# Patient Record
Sex: FEMALE | Race: BLACK OR AFRICAN AMERICAN | NOT HISPANIC OR LATINO | URBAN - METROPOLITAN AREA
[De-identification: names, ages, dates, MRNs, and addresses within clinical notes are randomized per-mention and may not be internally consistent; named-entity substitution may affect disease eponyms.]

---

## 2024-05-29 ENCOUNTER — LAB (OUTPATIENT)
Dept: LAB | Facility: LAB | Age: 24
End: 2024-05-29
Payer: COMMERCIAL

## 2024-05-29 ENCOUNTER — OFFICE VISIT (OUTPATIENT)
Dept: PRIMARY CARE | Facility: CLINIC | Age: 24
End: 2024-05-29
Payer: COMMERCIAL

## 2024-05-29 VITALS
SYSTOLIC BLOOD PRESSURE: 104 MMHG | TEMPERATURE: 96.4 F | WEIGHT: 133.2 LBS | HEIGHT: 68 IN | BODY MASS INDEX: 20.19 KG/M2 | OXYGEN SATURATION: 99 % | HEART RATE: 80 BPM | DIASTOLIC BLOOD PRESSURE: 75 MMHG

## 2024-05-29 DIAGNOSIS — R55 SYNCOPE, UNSPECIFIED SYNCOPE TYPE: Primary | ICD-10-CM

## 2024-05-29 DIAGNOSIS — Z11.59 ENCOUNTER FOR HEPATITIS C SCREENING TEST FOR LOW RISK PATIENT: ICD-10-CM

## 2024-05-29 DIAGNOSIS — R55 SYNCOPE, UNSPECIFIED SYNCOPE TYPE: ICD-10-CM

## 2024-05-29 LAB
ALBUMIN SERPL BCP-MCNC: 4.6 G/DL (ref 3.4–5)
ALP SERPL-CCNC: 60 U/L (ref 33–110)
ALT SERPL W P-5'-P-CCNC: 7 U/L (ref 7–45)
ANION GAP SERPL CALC-SCNC: 13 MMOL/L (ref 10–20)
AST SERPL W P-5'-P-CCNC: 13 U/L (ref 9–39)
BASOPHILS # BLD AUTO: 0.05 X10*3/UL (ref 0–0.1)
BASOPHILS NFR BLD AUTO: 0.5 %
BILIRUB SERPL-MCNC: 0.5 MG/DL (ref 0–1.2)
BUN SERPL-MCNC: 11 MG/DL (ref 6–23)
CALCIUM SERPL-MCNC: 9.8 MG/DL (ref 8.6–10.6)
CHLORIDE SERPL-SCNC: 104 MMOL/L (ref 98–107)
CHOLEST SERPL-MCNC: 129 MG/DL (ref 0–199)
CHOLESTEROL/HDL RATIO: 2.6
CO2 SERPL-SCNC: 26 MMOL/L (ref 21–32)
CREAT SERPL-MCNC: 0.65 MG/DL (ref 0.5–1.05)
EGFRCR SERPLBLD CKD-EPI 2021: >90 ML/MIN/1.73M*2
EOSINOPHIL # BLD AUTO: 0.57 X10*3/UL (ref 0–0.7)
EOSINOPHIL NFR BLD AUTO: 5.8 %
ERYTHROCYTE [DISTWIDTH] IN BLOOD BY AUTOMATED COUNT: 13.7 % (ref 11.5–14.5)
GLUCOSE SERPL-MCNC: 80 MG/DL (ref 74–99)
HCT VFR BLD AUTO: 40.3 % (ref 36–46)
HCV AB SER QL: NONREACTIVE
HDLC SERPL-MCNC: 48.9 MG/DL
HGB BLD-MCNC: 12.2 G/DL (ref 12–16)
IMM GRANULOCYTES # BLD AUTO: 0.02 X10*3/UL (ref 0–0.7)
IMM GRANULOCYTES NFR BLD AUTO: 0.2 % (ref 0–0.9)
IRON SATN MFR SERPL: 11 % (ref 25–45)
IRON SERPL-MCNC: 49 UG/DL (ref 35–150)
LDLC SERPL CALC-MCNC: 71 MG/DL
LYMPHOCYTES # BLD AUTO: 2.71 X10*3/UL (ref 1.2–4.8)
LYMPHOCYTES NFR BLD AUTO: 27.8 %
MCH RBC QN AUTO: 25.7 PG (ref 26–34)
MCHC RBC AUTO-ENTMCNC: 30.3 G/DL (ref 32–36)
MCV RBC AUTO: 85 FL (ref 80–100)
MONOCYTES # BLD AUTO: 0.5 X10*3/UL (ref 0.1–1)
MONOCYTES NFR BLD AUTO: 5.1 %
NEUTROPHILS # BLD AUTO: 5.9 X10*3/UL (ref 1.2–7.7)
NEUTROPHILS NFR BLD AUTO: 60.6 %
NON HDL CHOLESTEROL: 80 MG/DL (ref 0–149)
NRBC BLD-RTO: 0 /100 WBCS (ref 0–0)
PLATELET # BLD AUTO: 379 X10*3/UL (ref 150–450)
POTASSIUM SERPL-SCNC: 4.5 MMOL/L (ref 3.5–5.3)
PROT SERPL-MCNC: 7.6 G/DL (ref 6.4–8.2)
RBC # BLD AUTO: 4.74 X10*6/UL (ref 4–5.2)
SODIUM SERPL-SCNC: 138 MMOL/L (ref 136–145)
TIBC SERPL-MCNC: 450 UG/DL (ref 240–445)
TRIGL SERPL-MCNC: 44 MG/DL (ref 0–149)
UIBC SERPL-MCNC: 401 UG/DL (ref 110–370)
VLDL: 9 MG/DL (ref 0–40)
WBC # BLD AUTO: 9.8 X10*3/UL (ref 4.4–11.3)

## 2024-05-29 PROCEDURE — 80053 COMPREHEN METABOLIC PANEL: CPT

## 2024-05-29 PROCEDURE — 83540 ASSAY OF IRON: CPT

## 2024-05-29 PROCEDURE — 99204 OFFICE O/P NEW MOD 45 MIN: CPT

## 2024-05-29 PROCEDURE — 83550 IRON BINDING TEST: CPT

## 2024-05-29 PROCEDURE — 36415 COLL VENOUS BLD VENIPUNCTURE: CPT

## 2024-05-29 PROCEDURE — 1036F TOBACCO NON-USER: CPT

## 2024-05-29 PROCEDURE — 85025 COMPLETE CBC W/AUTO DIFF WBC: CPT

## 2024-05-29 PROCEDURE — 80061 LIPID PANEL: CPT

## 2024-05-29 PROCEDURE — 86803 HEPATITIS C AB TEST: CPT

## 2024-05-29 ASSESSMENT — COLUMBIA-SUICIDE SEVERITY RATING SCALE - C-SSRS
6. HAVE YOU EVER DONE ANYTHING, STARTED TO DO ANYTHING, OR PREPARED TO DO ANYTHING TO END YOUR LIFE?: NO
1. IN THE PAST MONTH, HAVE YOU WISHED YOU WERE DEAD OR WISHED YOU COULD GO TO SLEEP AND NOT WAKE UP?: NO
2. HAVE YOU ACTUALLY HAD ANY THOUGHTS OF KILLING YOURSELF?: NO

## 2024-05-29 ASSESSMENT — PATIENT HEALTH QUESTIONNAIRE - PHQ9
SUM OF ALL RESPONSES TO PHQ9 QUESTIONS 1 AND 2: 0
2. FEELING DOWN, DEPRESSED OR HOPELESS: NOT AT ALL
1. LITTLE INTEREST OR PLEASURE IN DOING THINGS: NOT AT ALL

## 2024-05-29 ASSESSMENT — ENCOUNTER SYMPTOMS
LOSS OF SENSATION IN FEET: 0
DEPRESSION: 1
OCCASIONAL FEELINGS OF UNSTEADINESS: 0

## 2024-05-29 ASSESSMENT — PAIN SCALES - GENERAL: PAINLEVEL: 0-NO PAIN

## 2024-05-29 NOTE — PROGRESS NOTES
Subjective   Patient ID: Gina Underwood is a 23 y.o. female with unremarkable PMH who presents to establish care with new PCP. Additional concern(s): Establish Care (Pt has been having black outs since 2020 the most recent was twice this past April./ Pt states since last oct she has been having trouble tasting ans smelling things but her covid testt has been negative. ).    HPI:  Pt is here to establish care, however, would like to discuss recent episodes of 'blacking out.'    Pt states her first episode was in 2020, where she became light headed, there were loud noises and she was sensory overloaded, her vision got dark and she had to lean on her counter at work to stop herself from falling. 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 Denies any LOC at this time.    Her next episode was at a concert in 2023, there was loud music and flashing lights, she again noticed herself feel weak and vision changes before losing consciousness. With this episode she states she was told she urinated on herself, did not report any shaking, tongue biting, but endorsed confusion after fall.    Her most recent episode occurred in April 2024, she was in the bathroom taking a hot shower when she became light headed, vision became dark and lost consciousness. With this episode, she denied any urination, shaking, tongue biting, however endorsed confusion after fall.     With all of these episodes, she did not seek any medical attention or go to the emergency department. Pt states she has no family history of seizures, no neurological history, no fam histroy of heart problems.     LMP may 14-24, heavier in flow than normal changing tampons every hour, intially using both tampons and pads due to heavy flow. Pt states she follow with an OBGYN for these issues and has a follow up appointment scheduled. Says she uses nexplanon for BC.    Additionally, in October, smell and  taste has been altered, tested for Covid a month later which came back negative. Throughout the day she can taste and smell,but notices it is abnormal later in the day.  - Pt has 2 Covid vaccines and booster    12 system ROS completed, negative except as noted above.    Patient care team:  Patient Care Team:  Roberto Gregory MD as PCP - General (Family Medicine)  Donte Osuna MD as PCP - [facility] PCP     There is no problem list on file for this patient.    No past medical history on file.  Past Surgical History:   Procedure Laterality Date    OTHER SURGICAL HISTORY  12/30/2022    Finger surgical procedure     No current outpatient medications on file prior to visit.     No current facility-administered medications on file prior to visit.      No Known Allergies  Social History     Socioeconomic History    Marital status: Single     Spouse name: Not on file    Number of children: Not on file    Years of education: Not on file    Highest education level: Not on file   Occupational History    Not on file   Tobacco Use    Smoking status: Never    Smokeless tobacco: Former   Substance and Sexual Activity    Alcohol use: Not Currently    Drug use: Yes     Types: Marijuana    Sexual activity: Not on file   Other Topics Concern    Not on file   Social History Narrative    Not on file     Social Determinants of Health     Financial Resource Strain: Not on file   Food Insecurity: Not on file   Transportation Needs: Not on file   Physical Activity: Not on file   Stress: Not on file   Social Connections: Not on file   Intimate Partner Violence: Not on file   Housing Stability: Not on file     No family history on file.     Social Determinants of Health     Tobacco Use: Medium Risk (5/29/2024)    Patient History     Smoking Tobacco Use: Never     Smokeless Tobacco Use: Former     Passive Exposure: Not on file   Alcohol Use: Not on file   Financial Resource Strain: Not on file   Food Insecurity: Not on file  "  Transportation Needs: Not on file   Physical Activity: Not on file   Stress: Not on file   Social Connections: Not on file   Intimate Partner Violence: Not on file   Depression: Not at risk (5/29/2024)    PHQ-2     PHQ-2 Score: 0   Housing Stability: Not on file   Utilities: Not on file   Digital Equity: Not on file   Health Literacy: Not on file        Objective   Vitals: /75 (BP Location: Right arm, Patient Position: Sitting, BP Cuff Size: Adult)   Pulse 80   Temp 35.8 °C (96.4 °F)   Ht 1.715 m (5' 7.5\")   Wt 60.4 kg (133 lb 3.2 oz)   SpO2 99%   BMI 20.55 kg/m²      Physical Exam  Constitutional:       General: She is not in acute distress.     Appearance: Normal appearance. She is not ill-appearing.   Cardiovascular:      Rate and Rhythm: Normal rate and regular rhythm.      Pulses: Normal pulses.      Heart sounds: Normal heart sounds. No murmur heard.     No gallop.   Pulmonary:      Effort: Pulmonary effort is normal.      Breath sounds: Normal breath sounds. No stridor. No wheezing.   Abdominal:      General: Abdomen is flat. Bowel sounds are normal. There is no distension.      Palpations: Abdomen is soft. There is no mass.      Tenderness: There is no abdominal tenderness. There is no guarding.   Skin:     General: Skin is warm and dry.      Coloration: Skin is not jaundiced or pale.      Findings: No erythema or rash.   Neurological:      General: No focal deficit present.      Mental Status: She is alert and oriented to person, place, and time.      Cranial Nerves: No cranial nerve deficit.      Sensory: No sensory deficit.      Motor: No weakness.      Coordination: Coordination normal.   Psychiatric:         Mood and Affect: Mood normal.         Behavior: Behavior normal.         Assessment/Plan   Problem List Items Addressed This Visit    None  Visit Diagnoses       Syncope, unspecified syncope type    -  Primary    Relevant Orders    Referral to Neurology    CBC and Auto Differential    " Comprehensive metabolic panel    Iron and TIBC    Lipid panel    ECG 12 lead (Ancillary Performed)    Encounter for hepatitis C screening test for low risk patient        Relevant Orders    Hepatitis C antibody            #syncopal episodes  - possibly secondary to hypotension, vasovagal episode, epilepsy/seizure, or cardiac arrhythmia  - Pt will be referred to neurology for further workup and EEG  - Pt will have basic lab work ordered to evaluate for anemia and electrolyte disturbances.  - Pt will have EKG ordered to assess for arrhythmia  - pt educated on hypotension and volume loss, in setting of heavy menstrual periods, patient states she will follow up with her OBGYN to have further discussion.   - Pt given seizure precautions including, not taking baths, and avoiding driving and operating heavy machinery  - routine blood work will be added on at this time for screening including lipid panel and hepatitis C screening    Pt to follow up after completing blood testing and scheduling with neurology, or earlier as needed. Patient urged to go to emergency department if she suffers another episode.    Patient seen and discussed with attending physician Dr. Austin  Plan preliminary until cosigned by attending physician.    Roberto Gregory MD  Family Medicine - PGY 1      Patient seen and discussed with attending physician Dr. YOUNG  Plan preliminary until cosigned by attending physician.    Roberto Gregory MD  Family Medicine - PGY 1

## 2024-05-29 NOTE — PROGRESS NOTES
Agree with Resident and/or Medical student plan for neurology referral for syncopal episodes and prelim work up with labs and EKG for further assessment.     Patient discussed with attending, Dr. Norman Curran MD  Family Medicine, PGY-3

## 2024-05-31 ENCOUNTER — OFFICE VISIT (OUTPATIENT)
Dept: OBSTETRICS AND GYNECOLOGY | Facility: HOSPITAL | Age: 24
End: 2024-05-31
Payer: COMMERCIAL

## 2024-05-31 VITALS
HEIGHT: 68 IN | WEIGHT: 130 LBS | HEART RATE: 76 BPM | BODY MASS INDEX: 19.7 KG/M2 | SYSTOLIC BLOOD PRESSURE: 112 MMHG | DIASTOLIC BLOOD PRESSURE: 73 MMHG

## 2024-05-31 DIAGNOSIS — Z12.4 SCREENING FOR MALIGNANT NEOPLASM OF CERVIX: Primary | ICD-10-CM

## 2024-05-31 PROCEDURE — 87491 CHLMYD TRACH DNA AMP PROBE: CPT | Performed by: OBSTETRICS & GYNECOLOGY

## 2024-05-31 PROCEDURE — 87661 TRICHOMONAS VAGINALIS AMPLIF: CPT | Performed by: OBSTETRICS & GYNECOLOGY

## 2024-05-31 PROCEDURE — 99214 OFFICE O/P EST MOD 30 MIN: CPT | Performed by: OBSTETRICS & GYNECOLOGY

## 2024-05-31 PROCEDURE — 88175 CYTOPATH C/V AUTO FLUID REDO: CPT | Mod: TC | Performed by: OBSTETRICS & GYNECOLOGY

## 2024-05-31 RX ORDER — FLUCONAZOLE 150 MG/1
TABLET ORAL
COMMUNITY
Start: 2022-09-06

## 2024-05-31 SDOH — ECONOMIC STABILITY: FOOD INSECURITY: WITHIN THE PAST 12 MONTHS, THE FOOD YOU BOUGHT JUST DIDN'T LAST AND YOU DIDN'T HAVE MONEY TO GET MORE.: NEVER TRUE

## 2024-05-31 SDOH — ECONOMIC STABILITY: FOOD INSECURITY: WITHIN THE PAST 12 MONTHS, YOU WORRIED THAT YOUR FOOD WOULD RUN OUT BEFORE YOU GOT MONEY TO BUY MORE.: NEVER TRUE

## 2024-05-31 SDOH — ECONOMIC STABILITY: TRANSPORTATION INSECURITY
IN THE PAST 12 MONTHS, HAS THE LACK OF TRANSPORTATION KEPT YOU FROM MEDICAL APPOINTMENTS OR FROM GETTING MEDICATIONS?: NO

## 2024-05-31 SDOH — ECONOMIC STABILITY: TRANSPORTATION INSECURITY
IN THE PAST 12 MONTHS, HAS LACK OF TRANSPORTATION KEPT YOU FROM MEETINGS, WORK, OR FROM GETTING THINGS NEEDED FOR DAILY LIVING?: NO

## 2024-05-31 ASSESSMENT — ENCOUNTER SYMPTOMS
DEPRESSION: 0
OCCASIONAL FEELINGS OF UNSTEADINESS: 0
LOSS OF SENSATION IN FEET: 0

## 2024-05-31 ASSESSMENT — PATIENT HEALTH QUESTIONNAIRE - PHQ9
2. FEELING DOWN, DEPRESSED OR HOPELESS: NOT AT ALL
1. LITTLE INTEREST OR PLEASURE IN DOING THINGS: NOT AT ALL
SUM OF ALL RESPONSES TO PHQ9 QUESTIONS 1 & 2: 0

## 2024-05-31 ASSESSMENT — PAIN SCALES - GENERAL: PAINLEVEL: 0-NO PAIN

## 2024-05-31 NOTE — LETTER
July 2, 2024     Gina Underwood  [address] [facility] [address]      Dear Ms. Underwood:    Below are the results from your recent visit:    Resulted Orders   THINPREP PAP TEST (21-24)   Result Value Ref Range    Case Report       Gynecologic Cytology                              Case: [id number]                                   Authorizing Provider:  Danielle Cavazos MD          Collected:           05/31/2024 1319              Ordering Location:     [facility]       Received:            05/31/2024 [facility]                                     Hospital                                                                     First Screen:          TRISH Mcwilliams                                                           Pathologist:           Verena Spencer MD                                                         Specimen:    ThinPrep Liquid-Based Pap-Imaging System Screen, CERVIX, SCREENING                         Final Cytological Interpretation       Squamous and/or Glandular Abnormality    A. THINPREP PAP CERVIX, SCREENING -     Specimen Adequacy  Satisfactory for evaluation; endocervical/transformation zone component is present    General Categorization  Epithelial cell abnormality- squamous cell, see interpretation.    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       ThinPrep Imaging System       This specimen has been analyzed by the ThinPrep Imaging System ([employer] an automated imaging and review system, which assists the laboratory in evaluating cells on ThinPrep Pap tests. Following automated imaging, selected fields from every slide were reviewed by a  cytotechnologist and/or pathologist.        Educational Note       Cervical cytology is a screening procedure primarily for squamous cancers and precursors and has associated false-negative and false-positives results as evidenced by published data. Your patient's test should be interpreted in this context, together with the patient's history and clinical findings. Regular sampling and follow-up of unexplained clinical signs and symptoms are recommended to minimize false negative results.      Perform HPV HR test? Never     Additional Testing: Chlamydia + Gonorrhea  Trichomonas     LMP 5/14/2024     Previous abnormal cytology/biopsy       LSIL  Abnormal/Atypical     C. Trachomatis / N. Gonorrhoeae, Amplified Detection   Result Value Ref Range    Neisseria gonorrhea,Amplified Negative Negative    Chlamydia trachomatis, Amplified Negative Negative    Narrative    The APTIMA Combo 2 assay is FDA-approved NAAT using target capture for the in vitro qualitative detection and differentiation of ribosomal RNA (rRNA) for Chlamydia trachomatis and Neisseria gonorrhoeae testing on clinician-collected endocervical, PreservCyt solution liquid Pap specimens, vaginal, throat, rectal, and male urethral swab specimens; patient-collected vaginal swab specimens, and female and male urine specimens from symptomatic and asymptomatic individuals. Samples from all other sites are not validated for this method.   Trichomonas vaginalis, Nucleic Acid Detection   Result Value Ref Range    Trichomonas Vaginalis Negative Negative, Invalid, TRICH neg    Narrative    The APTIMA Trichomonas vaginalis assay is FDA-approved for  testing on female endocervical swabs, vaginal swabs, and  ThinPrep liquid pap samples. Performance characteristics  for Trichomonas vaginalis on specific non-FDA-approved  sample types (female and male urine and male urethral  swabs) have been validated by [facility]. This laboratory is  certified by  CLIA to perform high complexity testing. Samples from all  other sites are not validated for this method.      We recommend that you repeat the above test(s) in 1 year. Your appointment is scheduled for 06/02/2025 @ 0300 pm. With Dr. Cavazos at the [facility] location.     If you have any questions or concerns, please don't hesitate to call [phone].         Sincerely,        Danielle Cavazos MD

## 2024-05-31 NOTE — PROGRESS NOTES
Subjective   Patient ID: Gina Underwood is a 23 y.o. female who presents for Annual Exam (patient here for annual /lmp 5/14/24/last pap 12/30/22/sti testing yes/chaperone decline).  HPI  Lives in [place] but she comes for her GYN exams.  She has been seeing by PCP for fainting episodes and she had a follow-up appointment with neuro doctor.   Nexplanone was done in 2023.  She is experiencing heavy bleeding with irregular periods but her last CBC was normal.   She also has had frequently BV infection and she currently experiences 1 of those for which she is treated with Flagyl.  Patient had a last Pap smear in December 2022 and was positive for LSI L that she did not do a follow-up after  Review of Systems  Fainting spells.  Objective   Physical Exam  General-patient is alert and oriented able to provide history  Skin-no lesion or rash  GYN exam-some discharge were noticed on cervical exam vaginal tenderness. Uterus is mobile and small.  No abnormal bleeding were noticed.  No lesion or rashes around the genital area.     Assessment/Plan   She is 23 years old female came today to the office for annual exam.  She had abnormal Pap smear in the past positive for LSIL.  Currently she is on Nexplanon and she is sexually active as frequently barrier method.    # Annual exam  -Repeat Pap smear today due to abnormal in the past    # Heavy bleeding  -Discussed with the patient symptoms versus other birth contraceptive method.  Patient does not want to change her Nexplanon, she want to wait till 3 years is done.  -If the bleeding is getting severe we can give you birth controll pill combined with nexplanon.      I discussed my plan with Dr. Cavazos    Follow-up in 1 year for annual exam or sooner if needed    Rocio Werner MD 05/31/24 12:08 PM   Family medicine resident  PGY 2

## 2024-06-03 LAB
C TRACH RRNA SPEC QL NAA+PROBE: NEGATIVE
N GONORRHOEA DNA SPEC QL PROBE+SIG AMP: NEGATIVE

## 2024-06-04 LAB — T VAGINALIS RRNA SPEC QL NAA+PROBE: NEGATIVE

## 2024-06-04 NOTE — PROGRESS NOTES
I saw and evaluated the patient. I personally obtained the key and critical portions of the history and physical exam or was physically present for key and critical portions performed by the resident/fellow. I reviewed the resident/fellow's documentation and discussed the patient with the resident/fellow. I agree with the resident/fellow's medical decision making as documented in the note.    The description of two episodes suggests orthostatic or vasovagal syncope/ near-syncope.   Specifically, feeling hot, having prodromal faintness and vision change, being able to stay standing during the first episode.   But the episode with flashing lights at a concert, followed by urine incontinence and post-event confusion for a period of time sounds possibly like a seizure.   There are no complaints of palpitations nor chest symptoms.  Reassuringly, her neurologic exam was normal at this visit.   We advised a chair or stool for her shower, and the use of compression stockings when standing or sitting for long.   Continue propranalol, as heart rate was not bradycardic.  CBC to r/o anemia, given heavy menses.   She should get an appointment with a neurology consultant asap for evaluation of syncopal episodes to assess possibility of seizures.  She is with family here now, but goes to college in a distant location; she and her family will determine how best to get the consultation.    Note that at this initial visit we did not fully assess her nutritional habits for signs of restricted eating or bulimia, which can be contributors to recurrent syncope.     Justyna Austin MD

## 2024-06-11 LAB
CYTOLOGY CMNT CVX/VAG CYTO-IMP: NORMAL
LAB AP HPV HR: NORMAL
LAB AP PAP ADDITIONAL TESTS: NORMAL
LAB AP PREVIOUS ABNORMAL HISTORY: NORMAL
LABORATORY COMMENT REPORT: NORMAL
LMP START DATE: NORMAL
PATH REPORT.TOTAL CANCER: NORMAL

## 2024-06-28 ENCOUNTER — APPOINTMENT (OUTPATIENT)
Dept: PRIMARY CARE | Facility: CLINIC | Age: 24
End: 2024-06-28
Payer: COMMERCIAL

## 2024-06-28 ENCOUNTER — TELEPHONE (OUTPATIENT)
Dept: OBSTETRICS AND GYNECOLOGY | Facility: CLINIC | Age: 24
End: 2024-06-28

## 2024-06-28 RX ORDER — DOXYCYCLINE 100 MG/1
1 CAPSULE ORAL
COMMUNITY
Start: 2024-05-14

## 2024-06-28 NOTE — PROGRESS NOTES
Subjective   Patient ID: Gina Underwood is a 23 y.o. female who presents for Follow-up.    Patient exited virtual visit/video call before being seen. Attempted to reach patient via [employer] messaging and phone calls to number on file with no answer. Will plan for follow-up at a later date.      Roberto Gregory MD  Family Medicine - PGY 1

## 2024-06-28 NOTE — PROGRESS NOTES
I discussed the patient with the resident and attending. Although they did not show up we reviewed.     Reviewed and approved by SHEREEN KISER on 6/28/24 at 12:05 PM.

## 2024-06-28 NOTE — TELEPHONE ENCOUNTER
----- Message from Danielle Cavazos MD sent at 6/27/2024  3:38 PM EDT -----  Repeat PAP in  12 months

## 2024-07-01 ENCOUNTER — TELEPHONE (OUTPATIENT)
Dept: OBSTETRICS AND GYNECOLOGY | Facility: CLINIC | Age: 24
End: 2024-07-01
Payer: COMMERCIAL

## 2024-07-01 NOTE — PROGRESS NOTES
I reviewed the resident/fellow's documentation and discussed the patient with the resident/fellow.     José Rodríguez MD

## 2025-06-02 ENCOUNTER — APPOINTMENT (OUTPATIENT)
Dept: OBSTETRICS AND GYNECOLOGY | Facility: CLINIC | Age: 25
End: 2025-06-02
Payer: COMMERCIAL

## 2025-07-02 ENCOUNTER — PROCEDURE VISIT (OUTPATIENT)
Dept: OBSTETRICS AND GYNECOLOGY | Facility: CLINIC | Age: 25
End: 2025-07-02
Payer: COMMERCIAL

## 2025-07-02 VITALS
DIASTOLIC BLOOD PRESSURE: 79 MMHG | WEIGHT: 125.3 LBS | SYSTOLIC BLOOD PRESSURE: 109 MMHG | HEART RATE: 87 BPM | BODY MASS INDEX: 19.34 KG/M2

## 2025-07-02 DIAGNOSIS — Z30.017 NEXPLANON INSERTION: Primary | ICD-10-CM

## 2025-07-02 PROCEDURE — 11983 REMOVE/INSERT DRUG IMPLANT: CPT | Performed by: STUDENT IN AN ORGANIZED HEALTH CARE EDUCATION/TRAINING PROGRAM

## 2025-07-02 PROCEDURE — 2500000004 HC RX 250 GENERAL PHARMACY W/ HCPCS (ALT 636 FOR OP/ED): Mod: JZ,SE | Performed by: STUDENT IN AN ORGANIZED HEALTH CARE EDUCATION/TRAINING PROGRAM

## 2025-07-02 RX ADMIN — ETONOGESTREL 1 EACH: 68 IMPLANT SUBCUTANEOUS at 15:49

## 2025-07-02 NOTE — PROGRESS NOTES
Subjective   Gina Underwood is a 24 y.o.  who presents today for nexplanon removal and reinsertion.    Has had nexplanon for years and overall has been happy but considering changing it because she does have sporadic spotting with it and feels like it may have affected her mood.    We discussed other options today and she wants removal and replacement today and consider changing in future.    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    Objective   Physical Exam  Vitals:    25 1426   BP: 109/79   Pulse: 87      Insertion of Contraceptive Capsule    Date/Time: 2025 4:03 PM    Performed by: Luann Garcia MD  Authorized by: Luann Garcia MD    Consent:     Consent obtained:  Written    Consent given by:  Patient    Procedural risks discussed:  Bleeding    Patient agrees, verbalizes understanding, and wants to proceed: yes    Indication:     Indication: Presence of non-biodegradable drug delivery implant    Pre-procedure:     Pre-procedure timeout performed: yes      Prepped with: alcohol 70% and povidone-iodine      Local anesthetic:  Lidocaine 1%    The site was cleaned and prepped in a sterile fashion: yes    Procedure:     Procedure:  Removal with reinsertion    Small stab incision was made in arm: yes      Left/right:  Left    Preloaded contraceptive capsule trocar was placed subdermally: yes      Visualization of implant was obtained: yes      Contraceptive capsule was inserted and trocar removed: yes      Visualization of notch in stylet and palpation of device: yes      Palpation confirms placement by provider and patient: yes      Site was closed with steri-strips and pressure bandage applied: yes    Comments:      Unable to remove nexplanon from initial incision on distal end given scar tissue. Small additional stab incision made on the  proximal end of the Nexplanon and successful removal.           Assessment/Plan     Gina Underwood is a 24 y.o.  who presents today for nexplanon removal and reinsertion.    - Discussed other options of long and short acting contraception. Will consider for future  - Removal and reinsertion today  - Encouraged smoking cesstion today    Follow up in 1 year for annual exam     Luann Garcia MD

## 2025-08-22 DIAGNOSIS — N93.9 ABNORMAL UTERINE BLEEDING (AUB): Primary | ICD-10-CM

## 2025-08-22 RX ORDER — NORETHINDRONE ACETATE AND ETHINYL ESTRADIOL 1.5-30(21)
1 KIT ORAL DAILY
Qty: 28 TABLET | Refills: 1 | Status: SHIPPED | OUTPATIENT
Start: 2025-08-22

## 2025-09-22 ENCOUNTER — APPOINTMENT (OUTPATIENT)
Dept: OBSTETRICS AND GYNECOLOGY | Facility: CLINIC | Age: 25
End: 2025-09-22
Payer: COMMERCIAL